# Patient Record
Sex: FEMALE | Race: WHITE | URBAN - METROPOLITAN AREA
[De-identification: names, ages, dates, MRNs, and addresses within clinical notes are randomized per-mention and may not be internally consistent; named-entity substitution may affect disease eponyms.]

---

## 2024-10-29 ENCOUNTER — HOSPITAL ENCOUNTER (OUTPATIENT)
Dept: MAMMOGRAPHY | Age: 44
Discharge: HOME | End: 2024-10-31

## 2024-10-29 VITALS — BODY MASS INDEX: 29.27 KG/M2 | WEIGHT: 155 LBS | HEIGHT: 61 IN

## 2024-10-29 DIAGNOSIS — Z12.31 ENCOUNTER FOR SCREENING MAMMOGRAM FOR MALIGNANT NEOPLASM OF BREAST: ICD-10-CM

## 2024-10-29 PROCEDURE — 77063 BREAST TOMOSYNTHESIS BI: CPT

## 2024-11-05 ENCOUNTER — CLINICAL DOCUMENTATION (OUTPATIENT)
Dept: GENERAL RADIOLOGY | Age: 44
End: 2024-11-05

## 2024-11-05 ENCOUNTER — TELEPHONE (OUTPATIENT)
Dept: GENERAL RADIOLOGY | Age: 44
End: 2024-11-05

## 2024-11-05 NOTE — PROGRESS NOTES
Mammogram clinical report and patient result letter provided to patient. Report sent to Dr. Ayleen Rodriguez per patient request on Authorization to Release the Results of Mammogram form.

## 2024-11-05 NOTE — TELEPHONE ENCOUNTER
Left generalized voicemail for patient advising additional imaging has been recommended for her. Requested call back.

## 2024-11-14 ENCOUNTER — TELEPHONE (OUTPATIENT)
Dept: GENERAL RADIOLOGY | Age: 44
End: 2024-11-14

## 2024-11-14 NOTE — TELEPHONE ENCOUNTER
Call to patient in reference to her mammogram performed on the [facility] on October 29, 2024.  Instructed patient that the radiologist has recommended some additional breast imaging, in order to make a final determination/result. A  from [facility] will contact her to schedule the additional imaging study/studies. Verbalizes understanding and is agreeable to proceed.

## 2024-11-27 ENCOUNTER — CLINICAL DOCUMENTATION (OUTPATIENT)
Dept: GENERAL RADIOLOGY | Age: 44
End: 2024-11-27

## 2024-11-27 NOTE — PROGRESS NOTES
Voice mail for patient regarding orders for additional imaging.  Dr Rodriguez's office canceled her orders because patient has not been seen in over a year.  Inquired if she has another doctor that she is up to date with, that we can get the orders from.  Requested a call back.

## 2024-12-18 ENCOUNTER — TELEPHONE (OUTPATIENT)
Dept: GENERAL RADIOLOGY | Age: 44
End: 2024-12-18

## 2024-12-18 NOTE — TELEPHONE ENCOUNTER
Spoke with patient regarding mammogram results.  Patient was upset because she got her mammogram too early, and her insurance didn't pay.  She stated since the mammo van was at her work, she went ahead and had the mammogram.  It was 2 days short of being a year from the previous one.  She stated that she needs to make an appointment with Dr Rodriguez, so she can get the orders for the additional imaging.  Patient plans on doing that soon.  Will touch base after her appointment with Dr Rodriguez.

## 2025-02-28 ENCOUNTER — CLINICAL DOCUMENTATION (OUTPATIENT)
Dept: GENERAL RADIOLOGY | Age: 45
End: 2025-02-28

## 2025-02-28 NOTE — PROGRESS NOTES
Patient is aware of need for additional breast imaging.  Spoke with patient numerous times.  She is aware she needs to see a doctor before they will write an order.